# Patient Record
Sex: MALE | Race: WHITE | NOT HISPANIC OR LATINO | URBAN - METROPOLITAN AREA
[De-identification: names, ages, dates, MRNs, and addresses within clinical notes are randomized per-mention and may not be internally consistent; named-entity substitution may affect disease eponyms.]

---
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
## 2023-09-12 NOTE — ED BEHAVIORAL HEALTH ASSESSMENT NOTE - EYE CONTACT
Patient called, requested refill.        Next Office Visit Date:  Future Appointments   Date Time Provider [address]   6/10/2024  9:00 AM CASE Gill - CNP [facility] Lucita Back [place] please review via [phone]
Good